# Patient Record
Sex: FEMALE | Race: WHITE | NOT HISPANIC OR LATINO | Employment: OTHER | URBAN - METROPOLITAN AREA
[De-identification: names, ages, dates, MRNs, and addresses within clinical notes are randomized per-mention and may not be internally consistent; named-entity substitution may affect disease eponyms.]

---

## 2017-01-13 LAB
EXT ALBUMIN: 3.9
EXT ALKALINE PHOSPHATASE: 98
EXT ALT: 26
EXT AST: 19
EXT BASOPHIL%: 0.7
EXT BILIRUBIN TOTAL: 0.54
EXT BUN: 34
EXT CALCIUM: 9.7
EXT CHLORIDE: 102
EXT CO2: 28
EXT CREATININE: 1.1 MG/DL
EXT EOSINOPHIL%: 3.6
EXT GFR MDRD NON AF AMER: 49
EXT GLUCOSE: 128
EXT HEMATOCRIT: 34
EXT HEMOGLOBIN: 11.4
EXT LYMPH%: 34.2
EXT MONOCYTES%: 12.1
EXT PLATELETS: 246
EXT POTASSIUM: 4.3
EXT PROTEIN TOTAL: 6.9
EXT SEGS%: 49.4
EXT SODIUM: 141 MMOL/L
EXT TACROLIMUS LVL: 4.2
EXT WBC: 4.45

## 2017-01-18 ENCOUNTER — TELEPHONE (OUTPATIENT)
Dept: TRANSPLANT | Facility: CLINIC | Age: 72
End: 2017-01-18

## 2017-01-18 NOTE — TELEPHONE ENCOUNTER
----- Message from Navarro Ferguson MD sent at 1/15/2017 10:40 AM CST -----  Results reviewed. Please advise labs are stable.

## 2017-01-18 NOTE — TELEPHONE ENCOUNTER
Labs reviewed by Dr. Ferguson  Continue routine labs no changes needed.  Letter sent for next lab appointment 04/10/17

## 2017-04-17 ENCOUNTER — TELEPHONE (OUTPATIENT)
Dept: TRANSPLANT | Facility: CLINIC | Age: 72
End: 2017-04-17

## 2017-04-17 LAB
EXT AFP: 4.3
EXT ALBUMIN: 4
EXT ALKALINE PHOSPHATASE: 92
EXT ALT: 17
EXT AST: 21
EXT BASOPHIL%: 0.4
EXT BILIRUBIN TOTAL: 0.6
EXT BUN: 27
EXT CALCIUM: 10.1
EXT CHLORIDE: 102
EXT CHOLESTEROL: 199
EXT CO2: 28
EXT CREATININE: 1.2 MG/DL
EXT EOSINOPHIL%: 4.7
EXT GFR MDRD NON AF AMER: 44
EXT GLUCOSE: 141
EXT HDL: 51
EXT HEMATOCRIT: 36
EXT HEMOGLOBIN: 12
EXT LDL CHOLESTEROL: 117
EXT LYMPH%: 36.5
EXT MONOCYTES%: 12.3
EXT PLATELETS: 252
EXT POTASSIUM: 4.9
EXT PROTEIN TOTAL: 7.2
EXT SEGS%: 46.1
EXT SODIUM: 141 MMOL/L
EXT TACROLIMUS LVL: 3.9
EXT TRIGLYCERIDES: 156
EXT WBC: 4.46

## 2017-04-17 NOTE — TELEPHONE ENCOUNTER
Returned call having some leg pain in the mornings, advised to see PCP or Neurologist about Pepe jeannie, may need Lyrica or Gabapentin.  Patient will make appt with doctor.

## 2017-04-17 NOTE — TELEPHONE ENCOUNTER
----- Message from Laney Murry sent at 4/17/2017  2:54 PM CDT -----  Contact: patient  Returning your call regarding labs results please call

## 2017-04-17 NOTE — TELEPHONE ENCOUNTER
Labs reviewed by Dr. Ferguson  Continue routine labs no changes needed.  Letter sent for next lab appointment 07/10/17

## 2017-04-17 NOTE — TELEPHONE ENCOUNTER
----- Message from Navarro Ferguson MD sent at 4/17/2017  9:36 AM CDT -----  Results reviewed. Please advise labs are stable.

## 2017-07-14 LAB
EXT AFP: 4.2
EXT ALBUMIN: 3.9
EXT ALKALINE PHOSPHATASE: 78
EXT ALT: 20
EXT AST: 27
EXT BASOPHIL%: 0.8
EXT BILIRUBIN TOTAL: 0.67
EXT BUN: 16
EXT CALCIUM: 1
EXT CHLORIDE: 96
EXT CHOLESTEROL: 172
EXT CO2: 28
EXT CREATININE: 1 MG/DL
EXT EOSINOPHIL%: 7.6
EXT GFR MDRD NON AF AMER: 54
EXT GLUCOSE: 107
EXT HDL: 56
EXT HEMATOCRIT: 36
EXT HEMOGLOBIN: 12.3
EXT LDL CHOLESTEROL: 90
EXT LYMPH%: 34.5
EXT MONOCYTES%: 13.3
EXT PLATELETS: 219
EXT POTASSIUM: 4.7
EXT PROTEIN TOTAL: 7.1
EXT SEGS%: 43.8
EXT SODIUM: 134 MMOL/L
EXT TACROLIMUS LVL: 4.5
EXT TRIGLYCERIDES: 132
EXT WBC: 3.83

## 2017-07-17 ENCOUNTER — TELEPHONE (OUTPATIENT)
Dept: TRANSPLANT | Facility: CLINIC | Age: 72
End: 2017-07-17

## 2017-07-17 NOTE — TELEPHONE ENCOUNTER
----- Message from Navarro Ferguson MD sent at 7/17/2017  7:42 AM CDT -----  Results reviewed. Please advise labs are stable.

## 2017-07-17 NOTE — TELEPHONE ENCOUNTER
Labs reviewed by Dr. Ferguson  Continue routine labs no changes needed.  Letter sent for next lab appointment 10/09/17

## 2017-08-07 ENCOUNTER — TELEPHONE (OUTPATIENT)
Dept: TRANSPLANT | Facility: CLINIC | Age: 72
End: 2017-08-07

## 2017-08-07 NOTE — TELEPHONE ENCOUNTER
Pt wants to reschedule pt states she was just down here and does not feel the need to keep coming back after being transplant 10 years ago please advise.

## 2017-08-23 ENCOUNTER — TELEPHONE (OUTPATIENT)
Dept: TRANSPLANT | Facility: CLINIC | Age: 72
End: 2017-08-23

## 2017-08-23 NOTE — TELEPHONE ENCOUNTER
Returned call advised her that she should see a Neurologist, about the Neuropathy.  She verbalized understanding.  I will send a message to  to have an appointment schedule for the patient with Dr Stanley

## 2017-08-23 NOTE — TELEPHONE ENCOUNTER
----- Message from Aida Baltazar sent at 8/23/2017  3:40 PM CDT -----  Contact: Pt  Pt states she is experiencing neuropathy from taking Prograf and would like to discuss this matter    Pt contact number [phone]  Thanks

## 2017-08-29 ENCOUNTER — TELEPHONE (OUTPATIENT)
Dept: TRANSPLANT | Facility: CLINIC | Age: 72
End: 2017-08-29

## 2017-10-12 DIAGNOSIS — Z94.4 LIVER REPLACED BY TRANSPLANT: ICD-10-CM

## 2017-10-12 RX ORDER — TACROLIMUS 1 MG/1
CAPSULE ORAL
Qty: 60 CAPSULE | Refills: 11 | Status: SHIPPED | OUTPATIENT
Start: 2017-10-12 | End: 2018-11-13 | Stop reason: SDUPTHER

## 2017-10-12 RX ORDER — MYCOPHENOLATE MOFETIL 250 MG/1
CAPSULE ORAL
Qty: 120 CAPSULE | Refills: 11 | Status: SHIPPED | OUTPATIENT
Start: 2017-10-12 | End: 2018-11-13 | Stop reason: SDUPTHER

## 2017-10-19 ENCOUNTER — TELEPHONE (OUTPATIENT)
Dept: TRANSPLANT | Facility: CLINIC | Age: 72
End: 2017-10-19

## 2017-10-19 DIAGNOSIS — Z94.4 LIVER REPLACED BY TRANSPLANT: ICD-10-CM

## 2017-10-19 DIAGNOSIS — C22.0 HEPATOCELLULAR CARCINOMA: Primary | ICD-10-CM

## 2017-10-19 LAB
EXT AFP: 3
EXT ALBUMIN: 4
EXT ALKALINE PHOSPHATASE: 102
EXT ALT: 21
EXT AST: 26
EXT BASOPHIL%: 0.4
EXT BILIRUBIN TOTAL: 0.58
EXT BUN: 21
EXT CALCIUM: 10.3
EXT CHLORIDE: 99
EXT CHOLESTEROL: 181
EXT CO2: 27
EXT CREATININE: 1 MG/DL
EXT EOSINOPHIL%: 2.6
EXT GFR MDRD NON AF AMER: 54
EXT GLUCOSE: 133
EXT HDL: 53
EXT HEMATOCRIT: 35
EXT HEMOGLOBIN: 12
EXT LDL CHOLESTEROL: 101
EXT LYMPH%: 28.8
EXT MONOCYTES%: 13.3
EXT PLATELETS: 252
EXT POTASSIUM: 4.7
EXT PROTEIN TOTAL: 7.1
EXT SEGS%: 54.9
EXT SODIUM: 133 MMOL/L
EXT TACROLIMUS LVL: 4.2
EXT TRIGLYCERIDES: 136
EXT WBC: 4.66

## 2017-10-19 RX ORDER — GABAPENTIN 100 MG/1
100 CAPSULE ORAL DAILY
COMMUNITY

## 2017-10-19 NOTE — TELEPHONE ENCOUNTER
Labs reviewed by Dr. Ferguson  Continue routine labs no changes needed.  Letter sent for next lab appointment 01/08/18

## 2017-10-19 NOTE — TELEPHONE ENCOUNTER
----- Message from Navarro Ferguson MD sent at 10/19/2017 10:20 AM CDT -----  Results reviewed. Please advise labs are stable.

## 2017-10-30 ENCOUNTER — TELEPHONE (OUTPATIENT)
Dept: TRANSPLANT | Facility: CLINIC | Age: 72
End: 2017-10-30

## 2017-10-30 NOTE — TELEPHONE ENCOUNTER
Returned call she thought she did not need labs.  I told her that was a serum creatinine for the MRI.  I re-sent her appt sheets.

## 2017-10-30 NOTE — TELEPHONE ENCOUNTER
----- Message from Jeanette Godoy MA sent at 10/30/2017  4:17 PM CDT -----  Pt is requesting to spaek with dennys... noon or later  ----- Message -----  From: Debbie Hinds  Sent: 10/30/2017   3:34 PM  To: Montana Mulligan, Student RN, #    Patient has a question about her appt.         Please call

## 2017-11-27 ENCOUNTER — HOSPITAL ENCOUNTER (OUTPATIENT)
Dept: RADIOLOGY | Facility: HOSPITAL | Age: 72
Discharge: HOME | End: 2017-11-27
Attending: INTERNAL MEDICINE
Payer: MEDICARE

## 2017-11-27 ENCOUNTER — TELEPHONE (OUTPATIENT)
Dept: TRANSPLANT | Facility: CLINIC | Age: 72
End: 2017-11-27

## 2017-11-27 ENCOUNTER — OFFICE VISIT (OUTPATIENT)
Dept: TRANSPLANT | Facility: CLINIC | Age: 72
End: 2017-11-27
Attending: INTERNAL MEDICINE
Payer: MEDICARE

## 2017-11-27 VITALS
BODY MASS INDEX: 30.73 KG/M2 | HEART RATE: 88 BPM | RESPIRATION RATE: 16 BRPM | DIASTOLIC BLOOD PRESSURE: 70 MMHG | SYSTOLIC BLOOD PRESSURE: 119 MMHG | OXYGEN SATURATION: 96 % | HEIGHT: 60 IN | WEIGHT: 156.5 LBS | TEMPERATURE: 98 F

## 2017-11-27 DIAGNOSIS — Z29.89 PROPHYLACTIC IMMUNOTHERAPY: Primary | Chronic | ICD-10-CM

## 2017-11-27 DIAGNOSIS — C22.0 HEPATOCELLULAR CARCINOMA: ICD-10-CM

## 2017-11-27 DIAGNOSIS — Z94.4 TRANSPLANTED LIVER: ICD-10-CM

## 2017-11-27 LAB
CREAT SERPL-MCNC: 1.1 MG/DL (ref 0.5–1.4)
SAMPLE: NORMAL

## 2017-11-27 PROCEDURE — 71020 XR CHEST PA AND LATERAL: CPT | Mod: TC

## 2017-11-27 PROCEDURE — 99213 OFFICE O/P EST LOW 20 MIN: CPT | Mod: S$PBB,,, | Performed by: INTERNAL MEDICINE

## 2017-11-27 PROCEDURE — 71020 XR CHEST PA AND LATERAL: CPT | Mod: 26,,, | Performed by: RADIOLOGY

## 2017-11-27 PROCEDURE — 74183 MRI ABD W/O CNTR FLWD CNTR: CPT | Mod: TC

## 2017-11-27 PROCEDURE — 99999 PR PBB SHADOW E&M-EST. PATIENT-LVL III: CPT | Mod: PBBFAC,,, | Performed by: INTERNAL MEDICINE

## 2017-11-27 PROCEDURE — A9585 GADOBUTROL INJECTION: HCPCS | Performed by: INTERNAL MEDICINE

## 2017-11-27 PROCEDURE — 74183 MRI ABD W/O CNTR FLWD CNTR: CPT | Mod: 26,GC,, | Performed by: RADIOLOGY

## 2017-11-27 PROCEDURE — 99213 OFFICE O/P EST LOW 20 MIN: CPT | Mod: PBBFAC | Performed by: INTERNAL MEDICINE

## 2017-11-27 PROCEDURE — 25500020 PHARM REV CODE 255: Performed by: INTERNAL MEDICINE

## 2017-11-27 RX ORDER — GADOBUTROL 604.72 MG/ML
10 INJECTION INTRAVENOUS
Status: COMPLETED | OUTPATIENT
Start: 2017-11-27 | End: 2017-11-27

## 2017-11-27 RX ADMIN — GADOBUTROL 10 ML: 604.72 INJECTION INTRAVENOUS at 12:11

## 2017-11-27 NOTE — TELEPHONE ENCOUNTER
Called patient to confirm appointment for today with Dr. Ferguson. Patient arrived during phone call.

## 2017-11-27 NOTE — Clinical Note
November 27, 2017                     [facility]  [address]  [facility] [address]  Phone: [phone]   Patient: Emerald Marin   MR Number: [id number]   YOB: 1945   Date of Visit: 11/27/2017       Dear      Thank you for referring Emerald Marin to me for evaluation. Attached you will find relevant portions of my assessment and plan of care.    If you have questions, please do not hesitate to call me. I look forward to following Emerald Marin along with you.    Sincerely,    Navarro Ferguson MD    Enclosure    If you would like to receive this communication electronically, please contact [email or web address] or [phone] to request [facility] Link access.    [facility] Link is a tool which provides read-only access to select patient information with whom you have a relationship. Its easy to use and provides real time access to review your patients record including encounter summaries, notes, results, and demographic information.    If you feel you have received this communication in error or would no longer like to receive these types of communications, please e-mail [email or web address]

## 2017-11-27 NOTE — PROGRESS NOTES
Transplant Hepatology  Liver Transplant Recipient Follow-up    Transplant Date: 2008  UNOS Native Liver Dx: Primary Liver Malignancy: Hepatoma (HCC) and Cirrhosis    Emerald is here for follow up of her liver transplant.    ORGAN: LIVER  Whole or Partial: whole liver  Donor Type:  - brain death  CDC High Risk: no  Donor CMV Status: positive  Donor HCV Status: negative  Donor HBcAb: negative  Biliary Anastomosis:   Arterial Anatomy:   IVC reconstruction:   Portal vein status:     She has had the following complications since transplant: none. The noted complications.    Subjective:     Interval History: Emerald was last seen on 8/15/16 by Flor SCHMITT. Currently, she is doing well. Current complaints include fatigue and sicca. She is now confined to a wheelchair due falls as a result of autonomic and peripheral neuropathy. Excellent allograft function.     Review of Systems   Constitutional: Positive for fatigue. Negative for appetite change and unexpected weight change.   HENT: Negative for ear pain, hearing loss, sore throat and trouble swallowing.    Eyes: Negative for visual disturbance.   Respiratory: Negative for cough and shortness of breath.    Cardiovascular: Negative for chest pain and palpitations.   Gastrointestinal: Negative for abdominal pain, nausea and vomiting.   Genitourinary: Negative for difficulty urinating and dysuria.   Musculoskeletal: Negative for arthralgias.   Skin: Negative for rash.   Neurological: Negative for tremors, seizures and headaches.   Psychiatric/Behavioral: Negative for agitation and decreased concentration.       Objective:     Physical Exam   Constitutional: She is oriented to person, place, and time. She appears well-developed and well-nourished.   HENT:   Right Ear: External ear normal.   Left Ear: External ear normal.   Mouth/Throat: Oropharynx is clear and moist.   Eyes: No scleral icterus.   Cardiovascular: Normal rate, regular rhythm and normal  heart sounds.  Exam reveals no gallop and no friction rub.    No murmur heard.  Pulmonary/Chest: Effort normal and breath sounds normal. No respiratory distress. She has no wheezes.   Abdominal: Soft. Bowel sounds are normal. She exhibits no distension and no mass. There is no tenderness.       Musculoskeletal: Normal range of motion. She exhibits no edema.   Lymphadenopathy:     She has no cervical adenopathy.   Neurological: She is alert and oriented to person, place, and time. She has normal strength.   Skin: Skin is warm, dry and intact. She is not diaphoretic. No pallor.     Lab Results   Component Value Date    BILITOT 0.5 07/11/2013    AST 15 07/11/2013    ALT 13 07/11/2013    ALKPHOS 79 07/11/2013    CREATININE 1.0 07/11/2013    ALBUMIN 3.9 07/11/2013     Lab Results   Component Value Date    WBC 3.44 (L) 07/11/2013    HGB 11.6 (L) 07/11/2013    HCT 34.8 (L) 07/11/2013     07/11/2013     Lab Results   Component Value Date    TACROLIMUS 5.1 07/11/2013    SIROLIMUSLEV 9.8 12/21/2008       Assessment/Plan:     1. Prophylactic immunotherapy    2. Transplanted liver for PBC        No change to current immunosuppression. RTC in 1 yr. MRI and labs today.    Navarro Ferguson MD           [facility] Patient Status  Functional Status: 100% - Normal, no complaints, no evidence of disease  Physical Capacity: Wheelchair bound

## 2017-11-28 ENCOUNTER — TELEPHONE (OUTPATIENT)
Dept: TRANSPLANT | Facility: CLINIC | Age: 72
End: 2017-11-28

## 2017-11-28 NOTE — TELEPHONE ENCOUNTER
----- Message from Navarro Ferguson MD sent at 11/28/2017  8:57 AM CST -----  Please advise that MR looks stable. Do you remember why it was done?

## 2017-12-13 ENCOUNTER — TELEPHONE (OUTPATIENT)
Dept: TRANSPLANT | Facility: CLINIC | Age: 72
End: 2017-12-13

## 2017-12-13 NOTE — TELEPHONE ENCOUNTER
----- Message from Octavia Green sent at 12/13/2017 12:39 PM CST -----  Contact: Hallie Nurse-Dr. Mikh[facility]  Calling to see if they can send over a clearance form for the pt to be cleared for surgery on the left total knee replacement. Needs clearance for major surgery. Pt cord is Maricruz Laboy.           Call back number: [phone]

## 2018-01-12 ENCOUNTER — TELEPHONE (OUTPATIENT)
Dept: TRANSPLANT | Facility: CLINIC | Age: 73
End: 2018-01-12

## 2018-01-12 LAB
EXT ALBUMIN: 4
EXT ALKALINE PHOSPHATASE: 86
EXT ALT: 30
EXT AST: 23
EXT BASOPHIL%: 0.5
EXT BILIRUBIN TOTAL: 0.74
EXT BUN: 24
EXT CALCIUM: 9.9
EXT CHLORIDE: 101
EXT CHOLESTEROL: 196
EXT CO2: 29
EXT CREATININE: 1.2 MG/DL
EXT EOSINOPHIL%: 3.1
EXT GFR MDRD NON AF AMER: 44
EXT GLUCOSE: 128
EXT HDL: 56
EXT HEMATOCRIT: 36
EXT HEMOGLOBIN: 12.2
EXT LDL CHOLESTEROL: 112
EXT LYMPH%: 33.8
EXT MONOCYTES%: 13.5
EXT PLATELETS: 240
EXT POTASSIUM: 4.6
EXT PROTEIN TOTAL: 7
EXT SEGS%: 49.1
EXT SODIUM: 141 MMOL/L
EXT TACROLIMUS LVL: 5.1
EXT TRIGLYCERIDES: 142
EXT WBC: 3.85

## 2018-01-12 NOTE — TELEPHONE ENCOUNTER
----- Message from Aida Baltazar sent at 1/12/2018 12:18 PM CST -----  Contact: Hallie with [facility],Hallie Reyes would like the protocol for how the pt is to take Prograf before and after surgery?    Contact number [phone]    Thanks

## 2018-01-15 ENCOUNTER — TELEPHONE (OUTPATIENT)
Dept: TRANSPLANT | Facility: CLINIC | Age: 73
End: 2018-01-15

## 2018-01-15 NOTE — TELEPHONE ENCOUNTER
----- Message from Navarro Ferguson MD sent at 1/15/2018  7:50 AM CST -----  Results reviewed. Please advise labs are stable.

## 2018-01-15 NOTE — TELEPHONE ENCOUNTER
Labs reviewed by Dr. Ferguson  Continue routine labs no changes needed.  Letter sent for next lab appointment 04/02/18

## 2018-04-06 ENCOUNTER — TELEPHONE (OUTPATIENT)
Dept: TRANSPLANT | Facility: CLINIC | Age: 73
End: 2018-04-06

## 2018-04-06 LAB
EXT ALBUMIN: 3.9
EXT ALKALINE PHOSPHATASE: 112
EXT ALT: 30
EXT AST: 27
EXT BASOPHIL%: 0.6
EXT BILIRUBIN TOTAL: 0.6
EXT BUN: 20
EXT CALCIUM: 9.8
EXT CHLORIDE: 104
EXT CO2: 29
EXT CREATININE: 0.9 MG/DL
EXT EOSINOPHIL%: 3.4
EXT GFR MDRD NON AF AMER: >60
EXT GLUCOSE: 180
EXT HEMATOCRIT: 35.5
EXT HEMOGLOBIN: 11.9
EXT LYMPH%: 30.3
EXT MONOCYTES%: 12.9
EXT PLATELETS: 265
EXT POTASSIUM: 4.2
EXT PROTEIN TOTAL: 7
EXT SEGS%: 52.8
EXT SODIUM: 139 MMOL/L
EXT TACROLIMUS LVL: 4.6
EXT WBC: 5

## 2018-04-06 NOTE — TELEPHONE ENCOUNTER
----- Message from Navarro Ferguson MD sent at 4/6/2018 11:22 AM CDT -----  Results reviewed. Please advise labs are stable.

## 2018-04-06 NOTE — TELEPHONE ENCOUNTER
Labs reviewed by Dr. Ferguson  Continue routine labs no changes needed.  Letter sent for next lab appointment 07/09/18

## 2018-04-06 NOTE — TELEPHONE ENCOUNTER
----- Message from Octavia Green sent at 4/6/2018  2:31 PM CDT -----  Contact: Pt  Pt calling to speak with Maricruz in regards to lab results.       Call back number: [phone]

## 2018-07-11 LAB
EXT AFP: 5.1
EXT ALBUMIN: 3.9
EXT ALKALINE PHOSPHATASE: 81
EXT ALT: 26
EXT AST: 32
EXT BASOPHIL%: 0.7
EXT BILIRUBIN TOTAL: 0.5
EXT BUN: 24
EXT CALCIUM: 9.7
EXT CHLORIDE: 102
EXT CHOLESTEROL: 188
EXT CO2: 28
EXT CREATININE: 0.9 MG/DL
EXT EOSINOPHIL%: 6.1
EXT GFR MDRD NON AF AMER: >60
EXT GLUCOSE: 111
EXT HDL: 50
EXT HEMATOCRIT: 35.7
EXT HEMOGLOBIN: 11.8
EXT LDL CHOLESTEROL: 115
EXT LYMPH%: 26.2
EXT MAGNESIUM: 4
EXT MONOCYTES%: 13.2
EXT PLATELETS: 247
EXT POTASSIUM: 4.6
EXT PROTEIN TOTAL: 6.9
EXT SEGS%: 53.8
EXT SODIUM: 140 MMOL/L
EXT TACROLIMUS LVL: 4.8
EXT TRIGLYCERIDES: 114
EXT WBC: 4.1

## 2018-07-17 ENCOUNTER — TELEPHONE (OUTPATIENT)
Dept: TRANSPLANT | Facility: CLINIC | Age: 73
End: 2018-07-17

## 2018-07-17 DIAGNOSIS — C22.0 HEPATOCELLULAR CARCINOMA: Primary | ICD-10-CM

## 2018-07-17 NOTE — TELEPHONE ENCOUNTER
----- Message from Navarro Ferguson MD sent at 7/16/2018  7:46 AM CDT -----  Results reviewed. Please advise labs are stable.

## 2018-07-17 NOTE — TELEPHONE ENCOUNTER
Labs reviewed by Dr. Ferguson  Continue routine labs no changes needed.  Letter sent for next lab appointment 10/08/18

## 2018-10-11 LAB
EXT AFP: 4.7
EXT ALBUMIN: 4
EXT ALKALINE PHOSPHATASE: 80
EXT ALT: 22
EXT AST: 28
EXT BASOPHIL%: 0.4
EXT BILIRUBIN TOTAL: 0.6
EXT BUN: 26
EXT CALCIUM: 9.7
EXT CHLORIDE: 100
EXT CHOLESTEROL: 213
EXT CO2: 27
EXT CREATININE: 1 MG/DL
EXT EOSINOPHIL%: 3.9
EXT GFR MDRD NON AF AMER: 54
EXT GLUCOSE: 131
EXT HDL: 51
EXT HEMATOCRIT: 36.2
EXT HEMOGLOBIN: 12.1
EXT LDL CHOLESTEROL: 132
EXT LYMPH%: 40
EXT MONOCYTES%: 11.7
EXT PLATELETS: 280
EXT POTASSIUM: 5
EXT PROTEIN TOTAL: 7
EXT SEGS%: 44
EXT SODIUM: 135 MMOL/L
EXT TACROLIMUS LVL: 5.3
EXT TRIGLYCERIDES: 150
EXT WBC: 4.9

## 2018-10-12 ENCOUNTER — TELEPHONE (OUTPATIENT)
Dept: TRANSPLANT | Facility: CLINIC | Age: 73
End: 2018-10-12

## 2018-10-12 DIAGNOSIS — C22.0 HEPATOCELLULAR CARCINOMA: Primary | ICD-10-CM

## 2018-10-12 NOTE — TELEPHONE ENCOUNTER
----- Message from Navarro Ferguson MD sent at 10/11/2018  1:44 PM CDT -----  Results reviewed. Please advise labs are stable.

## 2018-10-12 NOTE — TELEPHONE ENCOUNTER
Labs reviewed by Dr. Ferguson  Continue routine labs no changes needed.  Letter sent for next lab appointment 01/07/19

## 2018-11-13 DIAGNOSIS — Z94.4 LIVER REPLACED BY TRANSPLANT: ICD-10-CM

## 2018-11-14 RX ORDER — TACROLIMUS 1 MG/1
CAPSULE ORAL
Qty: 60 CAPSULE | Refills: 11 | Status: SHIPPED | OUTPATIENT
Start: 2018-11-14 | End: 2019-10-30 | Stop reason: SDUPTHER

## 2018-11-14 RX ORDER — MYCOPHENOLATE MOFETIL 250 MG/1
CAPSULE ORAL
Qty: 120 CAPSULE | Refills: 11 | Status: SHIPPED | OUTPATIENT
Start: 2018-11-14 | End: 2019-10-30 | Stop reason: SDUPTHER

## 2018-11-14 NOTE — TELEPHONE ENCOUNTER
----- Message from Tori White sent at 11/14/2018  3:19 PM CST -----  Contact: patient  Needs Advice    Reason for call: Patient would like to discuss a concern with coordinator         Communication Preference: [phone]    Additional Information: n/a

## 2018-11-15 ENCOUNTER — TELEPHONE (OUTPATIENT)
Dept: TRANSPLANT | Facility: CLINIC | Age: 73
End: 2018-11-15

## 2018-11-15 NOTE — TELEPHONE ENCOUNTER
Called patient to talk to her about her upcoming appointments, left voicemail to let her know there was no availability to change appointments

## 2018-11-16 ENCOUNTER — TELEPHONE (OUTPATIENT)
Dept: TRANSPLANT | Facility: CLINIC | Age: 73
End: 2018-11-16

## 2018-11-16 NOTE — TELEPHONE ENCOUNTER
----- Message from Arlette Fam sent at 11/16/2018  3:18 PM CST -----  Calling to get appt on 11/26 rescheduled/pt states she will call back monday    Pt contact [phone]

## 2018-11-19 ENCOUNTER — TELEPHONE (OUTPATIENT)
Dept: TRANSPLANT | Facility: CLINIC | Age: 73
End: 2018-11-19

## 2018-11-19 NOTE — TELEPHONE ENCOUNTER
Returned call patient will need to have knee replacement, will send message to Dr Robledo to see if she will need to continue imaging.

## 2018-11-19 NOTE — TELEPHONE ENCOUNTER
----- Message from Rivas Mckeon sent at 11/19/2018  3:54 PM CST -----  Contact: patient   Patient have a medical question but didn't state what the question was.             Please call [phone]      Thanks!

## 2018-11-20 ENCOUNTER — TELEPHONE (OUTPATIENT)
Dept: TRANSPLANT | Facility: CLINIC | Age: 73
End: 2018-11-20

## 2018-11-20 NOTE — TELEPHONE ENCOUNTER
----- Message from Navarro Ferguson MD sent at 11/20/2018  8:54 AM CST -----  No thanks  ----- Message -----  From: Maricruz Laboy RN  Sent: 11/19/2018   4:22 PM  To: Navarro Ferguson MD    Patient made 10 years in February.  Stage 4 HCC does she need to continue imaging?

## 2018-11-20 NOTE — TELEPHONE ENCOUNTER
Called patient to let her know she will not need any more imaging unless her blood work would show something, left voicemail.

## 2019-01-11 LAB
EXT ALBUMIN: 3.6
EXT ALKALINE PHOSPHATASE: 91
EXT ALT: 21
EXT AST: 28
EXT BASOPHIL%: 0.6
EXT BILIRUBIN TOTAL: 0.3
EXT BUN: 20
EXT CALCIUM: 9.2
EXT CHLORIDE: 101
EXT CO2: 30
EXT CREATININE: 1 MG/DL
EXT EOSINOPHIL%: 2
EXT GFR MDRD NON AF AMER: 54
EXT GLUCOSE: 113
EXT HEMATOCRIT: 34.4
EXT HEMOGLOBIN: 11.4
EXT LYMPH%: 34.8
EXT MONOCYTES%: 11.1
EXT PLATELETS: 293
EXT POTASSIUM: 4.6
EXT PROTEIN TOTAL: 6.7
EXT SEGS%: 51.5
EXT SODIUM: 136 MMOL/L
EXT TACROLIMUS LVL: 5.7
EXT WBC: 5

## 2019-01-16 ENCOUNTER — TELEPHONE (OUTPATIENT)
Dept: TRANSPLANT | Facility: CLINIC | Age: 74
End: 2019-01-16

## 2019-01-16 NOTE — TELEPHONE ENCOUNTER
Called patient-She wants to know if she can start seeing a hepatologist in [place] instead of here.  She wants to have her DR. Ferguson appt 11/26/18 later - closer to her xray at 1230 and wants her MRI earlier than 230pm.     

## 2019-01-16 NOTE — TELEPHONE ENCOUNTER
----- Message from Navarro Ferguson MD sent at 1/14/2019  7:58 AM CST -----  Results reviewed. Please advise labs are stable.

## 2019-01-16 NOTE — TELEPHONE ENCOUNTER
Labs reviewed by Dr. Ferguson  Continue routine labs no changes needed.  Letter sent for next lab appointment 04/08/19

## 2019-04-10 LAB
EXT AFP: 3.8
EXT ALBUMIN: 3.7
EXT ALKALINE PHOSPHATASE: 106
EXT ALT: 17
EXT AST: 15
EXT BASOPHIL%: 0.6
EXT BILIRUBIN TOTAL: 0.4
EXT BUN: 23
EXT CALCIUM: 9.6
EXT CHLORIDE: 101
EXT CHOLESTEROL: 121
EXT CO2: 30
EXT CREATININE: 1.1 MG/DL
EXT EOSINOPHIL%: 3.6
EXT GFR MDRD NON AF AMER: 48.2
EXT GLUCOSE: 146
EXT HDL: 51
EXT HEMATOCRIT: 36.3
EXT HEMOGLOBIN: 12.4
EXT LDL CHOLESTEROL: 136
EXT LYMPH%: 30.9
EXT MONOCYTES%: 14.9
EXT PLATELETS: 312
EXT POTASSIUM: 4.4
EXT PROTEIN TOTAL: 7
EXT SEGS%: 50
EXT SODIUM: 139 MMOL/L
EXT TACROLIMUS LVL: 4.6
EXT TRIGLYCERIDES: 213
EXT WBC: 4.7

## 2019-04-11 ENCOUNTER — TELEPHONE (OUTPATIENT)
Dept: TRANSPLANT | Facility: CLINIC | Age: 74
End: 2019-04-11

## 2019-04-11 NOTE — TELEPHONE ENCOUNTER
----- Message from Navarro Ferguson MD sent at 4/10/2019  5:04 PM CDT -----  Results reviewed. Please advise labs are stable.

## 2019-04-11 NOTE — TELEPHONE ENCOUNTER
Labs reviewed by Dr. Ferguson  Continue routine labs no changes needed.  Letter sent for next lab appointment 07/08/19

## 2019-07-08 ENCOUNTER — TELEPHONE (OUTPATIENT)
Dept: TRANSPLANT | Facility: CLINIC | Age: 74
End: 2019-07-08

## 2019-07-08 NOTE — TELEPHONE ENCOUNTER
----- Message from Glenis De Leon sent at 7/8/2019 11:07 AM CDT -----  Contact: patient   Please call pt at [phone]    Fax# [phone]    Patient is requesting a lab order to be sent    Patient is currently at [facility] now    Thank you

## 2019-07-10 ENCOUNTER — TELEPHONE (OUTPATIENT)
Dept: TRANSPLANT | Facility: CLINIC | Age: 74
End: 2019-07-10

## 2019-07-10 LAB
EXT AFP: 4.1
EXT ALBUMIN: 3.6
EXT ALKALINE PHOSPHATASE: 101
EXT ALT: 14
EXT AST: 18
EXT BASOPHIL%: 0.7
EXT BILIRUBIN TOTAL: 0.4
EXT BUN: 24
EXT CALCIUM: 9.7
EXT CHLORIDE: 106
EXT CO2: 31
EXT CREATININE: 1.21 MG/DL
EXT EOSINOPHIL%: 4.7
EXT GFR MDRD NON AF AMER: 43.2
EXT GLUCOSE: 116
EXT HEMATOCRIT: 36.2
EXT HEMOGLOBIN: 11.7
EXT LYMPH%: 38.3
EXT MONOCYTES%: 13
EXT PLATELETS: 274
EXT POTASSIUM: 4.5
EXT PROTEIN TOTAL: 6.7
EXT SEGS%: 43.3
EXT SODIUM: 142 MMOL/L
EXT TACROLIMUS LVL: 6.1
EXT WBC: 4.5

## 2019-07-10 NOTE — TELEPHONE ENCOUNTER
----- Message from Navarro Ferguson MD sent at 7/10/2019 12:02 PM CDT -----  Results reviewed. Please advise labs are stable.

## 2019-07-10 NOTE — TELEPHONE ENCOUNTER
Labs reviewed by Dr. Ferguson  Continue routine labs no changes needed.  Letter sent for next lab appointment 10/05/19

## 2019-10-09 LAB
EXT ALBUMIN: 3.7
EXT ALKALINE PHOSPHATASE: 83
EXT ALT: 14
EXT AST: 14
EXT BASOPHIL%: 0.7
EXT BILIRUBIN TOTAL: 0.5
EXT BUN: 24
EXT CALCIUM: 9.2
EXT CHLORIDE: 101
EXT CO2: 27
EXT CREATININE: 1.19 MG/DL
EXT EOSINOPHIL%: 3.7
EXT GFR MDRD NON AF AMER: 44.1
EXT GLUCOSE: 128
EXT HEMATOCRIT: 37.1
EXT HEMOGLOBIN: 12.3
EXT LYMPH%: 39.5
EXT MONOCYTES%: 13.3
EXT PLATELETS: 278
EXT POTASSIUM: 4.3
EXT PROTEIN TOTAL: 7
EXT SEGS%: 42.8
EXT SODIUM: 138 MMOL/L
EXT TACROLIMUS LVL: 5.9
EXT WBC: 4.1

## 2019-10-10 ENCOUNTER — TELEPHONE (OUTPATIENT)
Dept: TRANSPLANT | Facility: CLINIC | Age: 74
End: 2019-10-10

## 2019-10-10 NOTE — TELEPHONE ENCOUNTER
Labs reviewed by Dr. Ferguson  Continue routine labs no changes needed.  Letter sent for next lab appointment 01/06/20

## 2019-10-10 NOTE — TELEPHONE ENCOUNTER
----- Message from Navarro Ferguson MD sent at 10/10/2019  9:33 AM CDT -----  Results reviewed. Please advise labs are stable.

## 2019-10-30 DIAGNOSIS — Z94.4 LIVER REPLACED BY TRANSPLANT: ICD-10-CM

## 2019-10-30 RX ORDER — MYCOPHENOLATE MOFETIL 250 MG/1
CAPSULE ORAL
Qty: 120 CAPSULE | Refills: 11 | Status: SHIPPED | OUTPATIENT
Start: 2019-10-30 | End: 2020-11-11

## 2019-10-30 RX ORDER — TACROLIMUS 1 MG/1
CAPSULE ORAL
Qty: 60 CAPSULE | Refills: 11 | Status: SHIPPED | OUTPATIENT
Start: 2019-10-30 | End: 2020-11-11

## 2020-01-08 LAB
EXT AFP: 3.8
EXT ALBUMIN: 3.5
EXT ALKALINE PHOSPHATASE: 106
EXT ALT: 21
EXT AST: 18
EXT BASOPHIL%: 0.4
EXT BILIRUBIN TOTAL: 0.4
EXT BUN: 29
EXT CALCIUM: 9.8
EXT CHLORIDE: 101
EXT CO2: 30
EXT CREATININE: 0.95 MG/DL
EXT EOSINOPHIL%: 4.3
EXT GFR MDRD NON AF AMER: 57.1
EXT GLUCOSE: 123
EXT HEMATOCRIT: 38.1
EXT HEMOGLOBIN: 12.5
EXT LYMPH%: 35.4
EXT MONOCYTES%: 11.2
EXT PLATELETS: 297
EXT POTASSIUM: 4.3
EXT PROTEIN TOTAL: 7
EXT SEGS%: 48.7
EXT SODIUM: 139 MMOL/L
EXT TACROLIMUS LVL: 5.4
EXT WBC: 4.9

## 2020-01-10 ENCOUNTER — TELEPHONE (OUTPATIENT)
Dept: TRANSPLANT | Facility: CLINIC | Age: 75
End: 2020-01-10

## 2020-01-10 NOTE — TELEPHONE ENCOUNTER
Labs reviewed by Dr. Ferguson  Continue routine labs no changes needed.  Letter sent for next lab appointment 04/06/20

## 2020-01-10 NOTE — TELEPHONE ENCOUNTER
----- Message from Navarro Ferguson MD sent at 1/9/2020  1:06 PM CST -----  Results reviewed. Please advise labs are stable.

## 2020-02-18 ENCOUNTER — TELEPHONE (OUTPATIENT)
Dept: TRANSPLANT | Facility: CLINIC | Age: 75
End: 2020-02-18

## 2020-02-18 NOTE — TELEPHONE ENCOUNTER
Returned call let Resident know Prograf should run between 4 and 5.  They will check daily while patient is hospitalized.

## 2020-02-18 NOTE — TELEPHONE ENCOUNTER
----- Message from Florence Moss sent at 2/18/2020  2:34 PM CST -----  Contact: [facility] ellis  Calling to speak with nurse       Call Back : [phone]

## 2020-02-18 NOTE — TELEPHONE ENCOUNTER
"----- Message from Anjana Lu MA sent at 2/18/2020 12:00 PM CST -----  Contact: Luis A(pts care team at [facility]) [phone]  Pt is currently admitted to their hospital and he is needing to discuss "where pt's Tacrolimus levels need to be"     Luis A(pts care team at [facility]) [phone]  "

## 2020-03-30 ENCOUNTER — TELEPHONE (OUTPATIENT)
Dept: TRANSPLANT | Facility: CLINIC | Age: 75
End: 2020-03-30

## 2020-03-30 NOTE — TELEPHONE ENCOUNTER
----- Message from Shannan Bowers sent at 3/30/2020  1:29 PM CDT -----  Contact: PT   PT called to speak to her coordinator Maricruz regarding her blood work on Wednesday    Callback: [phone]

## 2020-05-08 ENCOUNTER — TELEPHONE (OUTPATIENT)
Dept: TRANSPLANT | Facility: CLINIC | Age: 75
End: 2020-05-08

## 2020-05-08 NOTE — TELEPHONE ENCOUNTER
----- Message from Laila Soriano RN sent at 5/7/2020  9:53 PM CDT -----  Contact: Pt      ----- Message -----  From: Alverto Michaud  Sent: 5/7/2020   2:51 PM CDT  To: [facility] Post-Liver Transplant Non-Clinical    Pt wants to speak to coord regarding letter received      [phone](H)

## 2020-05-11 ENCOUNTER — TELEPHONE (OUTPATIENT)
Dept: TRANSPLANT | Facility: CLINIC | Age: 75
End: 2020-05-11

## 2020-05-11 NOTE — TELEPHONE ENCOUNTER
----- Message from Tato Godoy sent at 5/11/2020  3:54 PM CDT -----  Contact: pt  Calling to speak with coordinator     Call back: [phone]

## 2020-05-20 LAB
EXT AFP: 4
EXT ALBUMIN: 3.8
EXT ALKALINE PHOSPHATASE: 100
EXT ALT: 16
EXT AST: 15
EXT BASOPHIL%: 0.2
EXT BILIRUBIN TOTAL: 0.6
EXT BUN: 28
EXT CALCIUM: 9.8
EXT CHLORIDE: 100
EXT CO2: 29
EXT CREATININE: 1.12 MG/DL
EXT EOSINOPHIL%: 2.4
EXT GFR MDRD NON AF AMER: 47.1
EXT GLUCOSE: 115
EXT HEMATOCRIT: 36.3
EXT HEMOGLOBIN: 12.1
EXT LYMPH%: 38.2
EXT MONOCYTES%: 13.7
EXT PLATELETS: 277
EXT POTASSIUM: 4.3
EXT PROTEIN TOTAL: 7.2
EXT SEGS%: 45.5
EXT SODIUM: 136 MMOL/L
EXT TACROLIMUS LVL: 4.3
EXT WBC: 4.6

## 2020-05-22 ENCOUNTER — TELEPHONE (OUTPATIENT)
Dept: TRANSPLANT | Facility: CLINIC | Age: 75
End: 2020-05-22

## 2020-05-22 NOTE — LETTER
May 22, 2020    [facility]  [address] [place] [address]          Dear [facility]:  MRN: [id number]    This is a follow up to your recent labs, your lab results were stable.  There are no medicine changes.  Please have your labs drawn again on 08/17/20.      If you cannot have your labs drawn on the scheduled date, it is your responsibility to call the transplant department to reschedule.  To reschedule or make an appointment, please as to speak to or leave a message for my assistant, Jeanette Magdaleno or Cristal, at [phone].  When leaving a message for Jeanette Magdaleno Angela or myself, we ask that you leave a brief message regarding your request.    Sincerely,    Maricruz Laboy RN      Your Liver Transplant Coordinator    [facility]  [address] [place] [address]  [phone]

## 2020-05-22 NOTE — TELEPHONE ENCOUNTER
Labs reviewed by Dr. Ferguson  Continue routine labs no changes needed.  Letter sent for next lab appointment 08/17/20

## 2020-05-22 NOTE — TELEPHONE ENCOUNTER
----- Message from Navarro Ferguson MD sent at 5/22/2020  9:51 AM CDT -----  Results reviewed. Please advise labs are stable.

## 2020-08-17 LAB
EXT ALBUMIN: 3.6
EXT ALKALINE PHOSPHATASE: 95
EXT ALT: 18
EXT AST: 14
EXT BILIRUBIN TOTAL: 0.4
EXT BUN: 23
EXT CALCIUM: 9.8
EXT CHLORIDE: 100
EXT CO2: 27
EXT CREATININE: 0.96 MG/DL
EXT EOSINOPHIL%: 3.8
EXT GLUCOSE: 121
EXT HEMATOCRIT: 36
EXT HEMOGLOBIN: 12.4
EXT LYMPH%: 33.3
EXT MONOCYTES%: 12
EXT PLATELETS: 289
EXT POTASSIUM: 4.2
EXT PROTEIN TOTAL: 7.1
EXT SEGS%: 50.4
EXT SODIUM: 135 MMOL/L
EXT TACROLIMUS LVL: 4.2
EXT WBC: 4.2

## 2020-08-21 ENCOUNTER — TELEPHONE (OUTPATIENT)
Dept: TRANSPLANT | Facility: CLINIC | Age: 75
End: 2020-08-21

## 2020-08-21 NOTE — TELEPHONE ENCOUNTER
Labs reviewed are stable, continue q 3 months. Letter sent.     ----- Message from Navarro Ferguson MD sent at 8/20/2020  2:21 PM CDT -----  Results reviewed. Please advise labs are stable.

## 2020-11-18 ENCOUNTER — TELEPHONE (OUTPATIENT)
Dept: TRANSPLANT | Facility: CLINIC | Age: 75
End: 2020-11-18

## 2020-11-18 LAB
EXT AFP: 3.6
EXT ALBUMIN: 3.9
EXT ALKALINE PHOSPHATASE: 108
EXT ALT: 15
EXT AST: 14
EXT BASOPHIL%: 0.5
EXT BILIRUBIN TOTAL: 0.5
EXT BUN: 29
EXT CALCIUM: 9.9
EXT CHLORIDE: 103
EXT CO2: 30
EXT CREATININE: 1.11 MG/DL
EXT EOSINOPHIL%: 3.4
EXT GFR MDRD NON AF AMER: 47.6
EXT GLUCOSE: 115
EXT HEMATOCRIT: 37.9
EXT HEMOGLOBIN: 12.4
EXT LYMPH%: 38.6
EXT MONOCYTES%: 13.8
EXT PLATELETS: 278
EXT POTASSIUM: 5.1
EXT PROTEIN TOTAL: 7.2
EXT SEGS%: 43.7
EXT SODIUM: 139 MMOL/L
EXT TACROLIMUS LVL: 5.4
EXT WBC: 4.4

## 2020-11-18 NOTE — LETTER
November 18, 2020    [facility]  [address] [place] [address]          [facility]  [facility]: [id number]    This is a follow up to your recent labs, your lab results were stable.  There are no medicine changes.  Please have your labs drawn again on 02/08/21.      If you cannot have your labs drawn on the scheduled date, it is your responsibility to call the transplant department to reschedule.  Please call [phone] and ask to speak to Jeanette - Medical  for all scheduling requests.     Sincerely,    Maricruz Laboy, RN      Your Liver Transplant Coordinator    [facility]  [address] [place] [address]  [phone]

## 2020-11-18 NOTE — TELEPHONE ENCOUNTER
Continue routine labs no changes needed.  Letter sent for next lab appointment 02/08/21      ----- Message from Navarro Ferguson MD sent at 11/18/2020 10:34 AM CST -----  Results reviewed. Please advise labs are stable.

## 2021-01-01 ENCOUNTER — TELEPHONE (OUTPATIENT)
Dept: TRANSPLANT | Facility: CLINIC | Age: 76
End: 2021-01-01

## 2021-01-01 LAB
EXT ALBUMIN: 3.7
EXT ALBUMIN: 3.8
EXT ALKALINE PHOSPHATASE: 100
EXT ALKALINE PHOSPHATASE: 104
EXT ALT: 18
EXT ALT: 20
EXT AST: 15
EXT AST: 15
EXT BASOPHIL%: 0.2
EXT BASOPHIL%: 0.4
EXT BILIRUBIN TOTAL: 0.5
EXT BILIRUBIN TOTAL: 0.6
EXT BUN: 32
EXT BUN: 33
EXT CALCIUM: 8.8
EXT CALCIUM: 9.4
EXT CHLORIDE: 103
EXT CHLORIDE: 104
EXT CO2: 28
EXT CO2: 28
EXT CREATININE: 1.22 MG/DL
EXT CREATININE: 1.24 MG/DL
EXT CYCLOSPORONE LEVEL: 6.1
EXT EOSINOPHIL%: 2.3
EXT EOSINOPHIL%: 4
EXT GFR MDRD NON AF AMER: 41.8
EXT GFR MDRD NON AF AMER: 42.6
EXT GLUCOSE: 133
EXT GLUCOSE: 166
EXT HEMATOCRIT: 36.2
EXT HEMATOCRIT: 36.6
EXT HEMOGLOBIN: 12
EXT HEMOGLOBIN: 12.1
EXT LYMPH%: 21.9
EXT LYMPH%: 29.1
EXT MONOCYTES%: 13.1
EXT MONOCYTES%: 9
EXT PLATELETS: 256
EXT PLATELETS: 290
EXT POTASSIUM: 4.6
EXT POTASSIUM: 4.8
EXT PROTEIN TOTAL: 6.8
EXT PROTEIN TOTAL: 7.1
EXT SEGS%: 53.4
EXT SEGS%: 66.6
EXT SODIUM: 138 MMOL/L
EXT SODIUM: 141 MMOL/L
EXT TACROLIMUS LVL: 5.3
EXT WBC: 5.3
EXT WBC: 6.5

## 2021-02-10 ENCOUNTER — TELEPHONE (OUTPATIENT)
Dept: TRANSPLANT | Facility: CLINIC | Age: 76
End: 2021-02-10

## 2021-02-10 LAB
EXT ALBUMIN: 3.8
EXT ALKALINE PHOSPHATASE: 103
EXT ALT: 15
EXT AST: 13
EXT BASOPHIL%: 0.5
EXT BILIRUBIN TOTAL: 0.5
EXT BUN: 29
EXT CALCIUM: 9.3
EXT CHLORIDE: 103
EXT CO2: 31
EXT CREATININE: 1.15 MG/DL
EXT EOSINOPHIL%: 2.3
EXT GFR MDRD NON AF AMER: 45.7
EXT GLUCOSE: 118
EXT HEMATOCRIT: 37.7
EXT HEMOGLOBIN: 12.6
EXT LYMPH%: 35.3
EXT MONOCYTES%: 11.9
EXT PLATELETS: 301
EXT POTASSIUM: 5
EXT PROTEIN TOTAL: 7
EXT SEGS%: 50
EXT SODIUM: 138 MMOL/L
EXT TACROLIMUS LVL: 6.6
EXT WBC: 4.3

## 2022-01-01 ENCOUNTER — TELEPHONE (OUTPATIENT)
Dept: TRANSPLANT | Facility: CLINIC | Age: 77
End: 2022-01-01
Payer: MEDICARE

## 2022-01-01 LAB
EXT AFP: 3.9
EXT ALBUMIN: 3.9
EXT ALKALINE PHOSPHATASE: 124
EXT ALT: 18
EXT AST: 17
EXT BASOPHIL%: 0.7
EXT BILIRUBIN TOTAL: 0.4
EXT BUN: 24
EXT CALCIUM: 9.9
EXT CHLORIDE: 102
EXT CO2: 30
EXT CREATININE: 1.31 MG/DL
EXT EOSINOPHIL%: 3.6
EXT GFR MDRD NON AF AMER: 39.2
EXT GLUCOSE: 147
EXT HEMATOCRIT: 39.2
EXT HEMOGLOBIN: 12.9
EXT LYMPH%: 37.9
EXT MONOCYTES%: 11
EXT PLATELETS: 325
EXT POTASSIUM: 5.4
EXT PROTEIN TOTAL: 7.3
EXT SEGS%: 46.8
EXT SODIUM: 139 MMOL/L
EXT TACROLIMUS LVL: 4
EXT WBC: 4.2

## 2022-01-13 NOTE — TELEPHONE ENCOUNTER
Continue routine labs no changes needed.  Letter sent for next lab appointment 04/25/22    ----- Message from Navarro Ferguson MD sent at 1/13/2022  1:48 PM CST -----  Results reviewed. No action.

## 2022-03-21 ENCOUNTER — TELEPHONE (OUTPATIENT)
Dept: TRANSPLANT | Facility: CLINIC | Age: 77
End: 2022-03-21
Payer: MEDICARE

## 2022-03-21 ENCOUNTER — POST MORTEM DOCUMENTATION (OUTPATIENT)
Dept: TRANSPLANT | Facility: CLINIC | Age: 77
End: 2022-03-21
Payer: MEDICARE

## 2022-03-21 NOTE — TELEPHONE ENCOUNTER
Returned call no answer.  I will let the team know.    ----- Message from Jorge Smith sent at 3/21/2022  9:29 AM CDT -----  Pt's sister Sobia Adkins called to state patient has passed away on March 12, 2022 at [facility]    Call: [phone]

## 2022-12-22 NOTE — TELEPHONE ENCOUNTER
Returned call left voicemail left message to continue Prograf 1 mg daily before and after surgery.  Cellcept should be taken 500 mg two times a day, the only way we stop it is if they have fever or an infection.  She can call back for any other questions.   [Chaperone Present] : A chaperone was present in the examining room during all aspects of the physical examination [Awake] : awake [Alert] : alert [Acute Distress] : no acute distress [Soft] : soft [Tender] : non tender [Oriented x3] : oriented to person, place, and time [Normal] : uterus [Discharge] : a  ~M vaginal discharge was present [Moderate] : moderate [Foul Smelling] : foul smelling [No Bleeding] : there was no active vaginal bleeding [Uterine Adnexae] : were not tender and not enlarged

## 2023-03-20 NOTE — LETTER
August 21, 2020    [facility]  [address] [place] [address]          Dear [facility]:  MRN: [id number]    This is a follow up to your recent labs, your lab results were stable.  There are no medicine changes.  Please have your labs drawn again on 11/16/20.      If you cannot have your labs drawn on the scheduled date, it is your responsibility to call the transplant department to reschedule.  Please call [phone] and ask to speak to Jeanette  Medical  for all scheduling requests.     Sincerely,        Your Liver Transplant Coordinator    [facility]  [address] [place] [address]  [phone]       
no